# Patient Record
Sex: FEMALE | Race: WHITE | Employment: OTHER | URBAN - METROPOLITAN AREA
[De-identification: names, ages, dates, MRNs, and addresses within clinical notes are randomized per-mention and may not be internally consistent; named-entity substitution may affect disease eponyms.]

---

## 2017-01-25 ENCOUNTER — ANESTHESIA EVENT (OUTPATIENT)
Dept: ENDOSCOPY | Age: 77
End: 2017-01-25
Payer: MEDICARE

## 2017-01-25 ENCOUNTER — ANESTHESIA (OUTPATIENT)
Dept: ENDOSCOPY | Age: 77
End: 2017-01-25
Payer: MEDICARE

## 2017-01-25 PROCEDURE — 74011250636 HC RX REV CODE- 250/636

## 2017-01-25 RX ORDER — PROPOFOL 10 MG/ML
INJECTION, EMULSION INTRAVENOUS
Status: DISCONTINUED | OUTPATIENT
Start: 2017-01-25 | End: 2017-01-25 | Stop reason: HOSPADM

## 2017-01-25 RX ORDER — PROPOFOL 10 MG/ML
INJECTION, EMULSION INTRAVENOUS AS NEEDED
Status: DISCONTINUED | OUTPATIENT
Start: 2017-01-25 | End: 2017-01-25 | Stop reason: HOSPADM

## 2017-01-25 RX ADMIN — PROPOFOL 125 MCG/KG/MIN: 10 INJECTION, EMULSION INTRAVENOUS at 08:06

## 2017-01-25 RX ADMIN — PROPOFOL 70 MG: 10 INJECTION, EMULSION INTRAVENOUS at 08:06

## 2017-01-25 NOTE — ANESTHESIA PREPROCEDURE EVALUATION
Anesthetic History   No history of anesthetic complications            Review of Systems / Medical History  Patient summary reviewed and pertinent labs reviewed    Pulmonary    COPD: mild               Neuro/Psych   Within defined limits           Cardiovascular    Hypertension              Exercise tolerance: >4 METS     GI/Hepatic/Renal  Within defined limits              Endo/Other  Within defined limits           Other Findings            Physical Exam    Airway  Mallampati: II  TM Distance: 4 - 6 cm  Neck ROM: normal range of motion        Cardiovascular  Regular rate and rhythm,  S1 and S2 normal,  no murmur, click, rub, or gallop  Rhythm: regular  Rate: normal         Dental  No notable dental hx       Pulmonary  Breath sounds clear to auscultation               Abdominal  GI exam deferred       Other Findings            Anesthetic Plan    ASA: 2  Anesthesia type: MAC          Induction: Intravenous  Anesthetic plan and risks discussed with: Patient

## 2017-01-25 NOTE — ANESTHESIA POSTPROCEDURE EVALUATION
Post-Anesthesia Evaluation and Assessment    Patient: Paul Davis MRN: [id number]  SSN: [id number]    YOB: 1940  Age: 68 y.o. Sex: female       Cardiovascular Function/Vital Signs  Visit Vitals    BP 97/58    Pulse 66    Temp 36.4 °C (97.5 °F)    Resp 24    Ht 4' 10\" (1.473 m)    Wt 52.2 kg (115 lb)    SpO2 100%    Breastfeeding No    BMI 24.04 kg/m2       Patient is status post MAC anesthesia for Procedure(s):  COLONOSCOPY  ENDOSCOPIC POLYPECTOMY. Nausea/Vomiting: None    Postoperative hydration reviewed and adequate. Pain:  Pain Scale 1: Numeric (0 - 10) (01/25/17 0830)  Pain Intensity 1: 0 (01/25/17 0830)   Managed    Neurological Status: At baseline    Mental Status and Level of Consciousness: Arousable    Pulmonary Status:   O2 Device: Room air (01/25/17 0830)   Adequate oxygenation and airway patent    Complications related to anesthesia: None    Post-anesthesia assessment completed.  No concerns    Signed By: Fredo Bryant MD     January 25, 2017

## 2023-10-16 ENCOUNTER — APPOINTMENT (OUTPATIENT)
Facility: HOSPITAL | Age: 83
End: 2023-10-16
Payer: MEDICARE

## 2023-10-16 ENCOUNTER — HOSPITAL ENCOUNTER (EMERGENCY)
Facility: HOSPITAL | Age: 83
Discharge: HOME | End: 2023-10-16
Attending: EMERGENCY MEDICINE
Payer: MEDICARE

## 2023-10-16 VITALS
HEIGHT: 56 IN | SYSTOLIC BLOOD PRESSURE: 143 MMHG | WEIGHT: 106 LBS | RESPIRATION RATE: 18 BRPM | HEART RATE: 68 BPM | TEMPERATURE: 97.9 F | OXYGEN SATURATION: 96 % | BODY MASS INDEX: 23.84 KG/M2 | DIASTOLIC BLOOD PRESSURE: 68 MMHG

## 2023-10-16 DIAGNOSIS — S62.114A CLOSED NONDISPLACED FRACTURE OF TRIQUETRUM OF RIGHT WRIST, INITIAL ENCOUNTER: ICD-10-CM

## 2023-10-16 DIAGNOSIS — S09.90XA TRAUMATIC INJURY OF HEAD, INITIAL ENCOUNTER: ICD-10-CM

## 2023-10-16 DIAGNOSIS — S60.221A CONTUSION OF RIGHT HAND, INITIAL ENCOUNTER: Primary | ICD-10-CM
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
## 2023-10-16 DIAGNOSIS — S00.01XA ABRASION OF SCALP, INITIAL ENCOUNTER: ICD-10-CM

## 2023-10-16 DIAGNOSIS — S00.03XA CONTUSION OF SCALP, INITIAL ENCOUNTER: ICD-10-CM

## 2023-10-16 PROCEDURE — 90714 TD VACC NO PRESV 7 YRS+ IM: CPT | Performed by: EMERGENCY MEDICINE

## 2023-10-16 PROCEDURE — 6370000000 HC RX 637 (ALT 250 FOR IP): Performed by: EMERGENCY MEDICINE

## 2023-10-16 PROCEDURE — 6360000002 HC RX W HCPCS: Performed by: EMERGENCY MEDICINE

## 2023-10-16 PROCEDURE — 90471 IMMUNIZATION ADMIN: CPT | Performed by: EMERGENCY MEDICINE

## 2023-10-16 PROCEDURE — 99284 EMERGENCY DEPT VISIT MOD MDM: CPT

## 2023-10-16 PROCEDURE — 73110 X-RAY EXAM OF WRIST: CPT

## 2023-10-16 PROCEDURE — 70450 CT HEAD/BRAIN W/O DYE: CPT

## 2023-10-16 RX ORDER — ACETAMINOPHEN 500 MG
1000 TABLET ORAL
Status: COMPLETED | OUTPATIENT
Start: 2023-10-16 | End: 2023-10-16

## 2023-10-16 RX ORDER — ACETAMINOPHEN 325 MG/1
650 TABLET ORAL EVERY 6 HOURS PRN
Qty: 120 TABLET | Refills: 3 | Status: SHIPPED | OUTPATIENT
Start: 2023-10-16

## 2023-10-16 RX ADMIN — ACETAMINOPHEN 1000 MG: 500 TABLET ORAL at 19:54

## 2023-10-16 RX ADMIN — CLOSTRIDIUM TETANI TOXOID ANTIGEN (FORMALDEHYDE INACTIVATED) AND CORYNEBACTERIUM DIPHTHERIAE TOXOID ANTIGEN (FORMALDEHYDE INACTIVATED) 0.5 ML: 5; 2 INJECTION, SUSPENSION INTRAMUSCULAR at 19:54

## 2023-10-16 ASSESSMENT — LIFESTYLE VARIABLES
HOW OFTEN DO YOU HAVE A DRINK CONTAINING ALCOHOL: MONTHLY OR LESS
HOW MANY STANDARD DRINKS CONTAINING ALCOHOL DO YOU HAVE ON A TYPICAL DAY: PATIENT DOES NOT DRINK

## 2023-10-16 ASSESSMENT — PAIN DESCRIPTION - LOCATION: LOCATION: ANKLE

## 2023-10-16 ASSESSMENT — PAIN SCALES - GENERAL: PAINLEVEL_OUTOF10: 6

## 2023-10-16 ASSESSMENT — PAIN DESCRIPTION - ORIENTATION: ORIENTATION: RIGHT

## 2023-10-16 NOTE — ED TRIAGE NOTES
Pt states she fell this afternoon. She tripped backwards over a solar lamp in her garden. She landed in [facility]. Pt states she did hit her head. +aspirin. Denies LOC or head pain. Main concern is her R wrist. Wrist is bruised a slightly swollen. Pt denies taking pain medicine PTA. Pain is currently 7/10.

## 2023-10-17 NOTE — ED NOTES
Pt called back into room by MD. Change to xray read - brace needed.  Pt sat in room and wrist brace applied to R wrist.     Bel Ennis RN  10/16/23 [id number]

## 2023-10-17 NOTE — ED NOTES
Pt given dc paperwork. Pt stated understanding of teaching and denied questions. Pt tolerated dc well. Pt ambulated out of ER with all belongings. Pt is A&Ox4, NAD, VSS, pain improved.      Augustin Guillory RN  10/16/23 [id number]

## 2025-04-18 ENCOUNTER — HOSPITAL ENCOUNTER (EMERGENCY)
Facility: HOSPITAL | Age: 85
Discharge: HOME | End: 2025-04-18
Attending: EMERGENCY MEDICINE
Payer: MEDICARE

## 2025-04-18 ENCOUNTER — APPOINTMENT (OUTPATIENT)
Facility: HOSPITAL | Age: 85
End: 2025-04-18
Payer: MEDICARE

## 2025-04-18 VITALS
RESPIRATION RATE: 20 BRPM | HEIGHT: 56 IN | HEART RATE: 94 BPM | BODY MASS INDEX: 23.84 KG/M2 | SYSTOLIC BLOOD PRESSURE: 119 MMHG | OXYGEN SATURATION: 98 % | TEMPERATURE: 97.7 F | WEIGHT: 106 LBS | DIASTOLIC BLOOD PRESSURE: 102 MMHG

## 2025-04-18 DIAGNOSIS — M70.21 OLECRANON BURSITIS OF RIGHT ELBOW: Primary | ICD-10-CM

## 2025-04-18 PROCEDURE — 99283 EMERGENCY DEPT VISIT LOW MDM: CPT

## 2025-04-18 PROCEDURE — 73080 X-RAY EXAM OF ELBOW: CPT

## 2025-04-18 RX ORDER — METHYLPREDNISOLONE 4 MG/1
TABLET ORAL
Qty: 1 KIT | Refills: 0 | Status: SHIPPED | OUTPATIENT
Start: 2025-04-18 | End: 2025-04-24

## 2025-04-18 ASSESSMENT — PAIN DESCRIPTION - PAIN TYPE: TYPE: ACUTE PAIN

## 2025-04-18 ASSESSMENT — PAIN SCALES - GENERAL: PAINLEVEL_OUTOF10: 7

## 2025-04-18 ASSESSMENT — PAIN DESCRIPTION - ORIENTATION: ORIENTATION: RIGHT

## 2025-04-18 ASSESSMENT — LIFESTYLE VARIABLES
HOW OFTEN DO YOU HAVE A DRINK CONTAINING ALCOHOL: NEVER
HOW MANY STANDARD DRINKS CONTAINING ALCOHOL DO YOU HAVE ON A TYPICAL DAY: PATIENT DOES NOT DRINK

## 2025-04-18 ASSESSMENT — PAIN DESCRIPTION - DESCRIPTORS: DESCRIPTORS: ACHING

## 2025-04-18 ASSESSMENT — PAIN - FUNCTIONAL ASSESSMENT: PAIN_FUNCTIONAL_ASSESSMENT: 0-10

## 2025-04-18 ASSESSMENT — PAIN DESCRIPTION - LOCATION: LOCATION: ELBOW

## 2025-04-18 NOTE — ED TRIAGE NOTES
Pt arrives to ED w/ cc of R elbow swelling x 2 weeks, worsening today. Pt reports doctor has recently changed meds around and is wondering if that is the cause. Hx CHF, HTN, MI, AFIB. Denies injury, cp,sob,dizziness.

## 2025-04-18 NOTE — ED PROVIDER NOTES
[facility]PART[facility]  EMERGENCY DEPARTMENT ENCOUNTER      Pt Name: Nicol Gutiérrez  MRN: [id number]  Birthdate 1940  Date of evaluation: 4/18/2025  Provider: Radha Cancino PA-C    CHIEF COMPLAINT       Chief Complaint   Patient presents with    Joint Swelling         HISTORY OF PRESENT ILLNESS   (Location/Symptom, Timing/Onset, Context/Setting, Quality, Duration, Modifying Factors, Severity)  Note limiting factors.   Patient is an 85-year-old female presenting with concerns of isolated right elbow swelling.  She denies any specific injury and denies ever having this happen before.  She denies any recent fevers.  She states that has been causing her some pain for the last 2 weeks but the swelling started yesterday and worsened today.  She recently was diagnosed with A-fib and had some medication changes and wanted to make sure that nothing was going wrong.  She denies any chest pain or shortness of breath or dizziness at this time.  She also has past medical history of CHF hypertension and previous heart attack.  Patient admits that she has full range of motion and it is only tender when pushing in certain areas on the swelling of the right elbow.            Review of External Medical Records:     Nursing Notes were reviewed.    REVIEW OF SYSTEMS    (2-9 systems for level 4, 10 or more for level 5)     Review of Systems    Except as noted above the remainder of the review of systems was reviewed and negative.       PAST MEDICAL HISTORY     Past Medical History:   Diagnosis Date    Atrial fibrillation (HCC)     Hypertension          SURGICAL HISTORY     History reviewed. No pertinent surgical history.      CURRENT MEDICATIONS       Discharge Medication List as of 4/18/2025  1:07 PM        CONTINUE these medications which have NOT CHANGED    Details   acetaminophen (AMINOFEN) 325 MG tablet Take 2 tablets by mouth every 6 hours as needed for Pain, Disp-120 tablet, R-3Print             ALLERGIES

## 2025-04-18 NOTE — DISCHARGE INSTRUCTIONS
Your x-ray and examination today revealed olecranon bursitis which is an inflammation of the elbow joint space.  We treat this with rest ice compressing and elevating and then I have sent in for a steroid Dosepak for you to take as instructed.  As we discussed, you are not supposed to take NSAIDs such as ibuprofen or Aleve when you are on blood thinning medication such as Eliquis and aspirin we have decided to give you a steroid to treat this instead.  You may follow-up with Ortho which I have given for you education and management of this.  Please come back if symptoms worsen or if you have concerns.  Of any difficulty breathing or swallowing or any other adverse reaction when taking this medication then please stop taking and come to the hospital or the nearest emergency department.    Thank you for allowing us to provide you with medical care today.  We realize that you have many choices for your emergency care needs.  We thank you for choosing [facility].  Please choose us in the future for any continued health care needs.     The exam and treatment you received in the Emergency Department were for an emergent problem and are not intended as complete care. It is important that you follow up with a doctor, nurse practitioner, or physician assistant for ongoing care. If your symptoms worsen or you do not improve as expected and you are unable to reach your usual health care provider, you should return to the Emergency Department. We are available 24 hours a day.     Please make an appointment with your healthcare provider(s) for follow up of your Emergency Department visit.  Take this sheet with you when you go to your follow-up visit.